# Patient Record
(demographics unavailable — no encounter records)

---

## 2017-06-17 NOTE — ED UPPER EXTREMITY
General


Chief Complaint:  Upper Extremity


Stated Complaint:  RT ARM & HAND PAIN


Nursing Triage Note:  


Brought to ED with family reporting right shouler/arm pain.  Hx of 2 months but 


worsened severely tonight


Nursing Sepsis Screen:  No Definite Risk


Source:  patient


Exam Limitations:  no limitations





History of Present Illness


Time seen by provider:  03:00


Initial Comments


Here with complaint of right arm pain.  She has had pain over the last couple 

of months but it was worse tonight.  She did take an ibuprofen at 10 p.m.  The 

pain then worsened and she came here for further evaluation.  Denies chest pain

, breathing problems, nausea, vomiting or sweating.  Believes that this may 

emanate from the shoulder and radiates down the arm.  She has had shoulder 

problems and had x-ray at her primary care doctor's office that did not show 

any concerning findings per the family.  Denies any recent injury.


Onset:  this evening


Severity:  moderate


Pain/Injury Location:  right shoulder, right arm, right elbow, right forearm


Method of Injury:  unknown


Modifying Factors:  Improves With Immobilization, Worse With Movement





Allergies and Home Medications


Allergies


Coded Allergies:  


     No Known Drug Allergies (Unverified , 9/17/14)





Home Medications


Apixaban 5 Mg Tablet, 5 MG PO BID, #60 Ref 3


   Prescribed by: DANIEL CHOUDHURY on 7/4/15 1030


Diltiazem Hcl 180 Mg Cap, 180 MG PO DAILY, #30 Ref 3


   Prescribed by: DANIEL CHOUDHURY on 7/4/15 1030


Metformin Hcl 1,000 Mg Tablet, 1 EACH PO BID WITH MEALS, (Reported)





Constitutional:  see HPI, No chills, No fever


Respiratory:  no symptoms reported, No cough, No short of breath, No wheezing


Cardiovascular:  No palpitations


Gastrointestinal:  no symptoms reported, No nausea, No vomiting


Musculoskeletal:  see HPI, joint pain, joint swelling, muscle pain, muscle 

stiffness


Skin:  no symptoms reported


Psychiatric/Neurological:  No Symptoms Reported





Past Medical-Social-Family Hx


Patient Social History


Alcohol Use:  Denies Use


Recreational Drug Use:  No


Smoking Status:  Never a Smoker


Recent Foreign Travel:  No


Contact w/Someone Who Travel:  No


Recent Infectious Disease Expo:  No


Recent Hopitalizations:  No





Immunizations Up To Date


Date of Pneumonia Vaccine:  Jul 3, 2013





Seasonal Allergies


Seasonal Allergies:  No





Surgeries


HX Surgeries:  Yes (LYPOMA EXCISED)


Surgeries:  Lumpectomy





Respiratory


Hx Respiratory Disorders:  No





Cardiovascular


Hx Cardiac Disorders:  Yes


Cardiac Disorders:  Hypertension, Irregular Heartbeat





Neurological


Hx Neurological Disorders:  No





Reproductive System


Hx Reproductive Disorders:  No


Sexually Transmitted Disease:  No


HIV/AIDS:  No





Genitourinary


Hx Genitourinary Disorders:  No





Gastrointestinal


Hx Gastrointestinal Disorders:  No





Musculoskeletal


Hx Musculoskeletal Disorders:  No





Endocrine


Hx Endocrine Disorders:  Yes


Endocrine Disorders:  Diabetes, Non-Insulin dep





HEENT


HX ENT Disorders:  No





Cancer


Hx Cancer:  No





Psychosocial


Hx Psychiatric Problems:  No





Integumentary


HX Skin/Integumentary Disorder:  No





Blood Transfusions


Hx Blood Disorders:  No





Reviewed Nursing Assessment


Reviewed/Agree w Nursing PMH:  Yes





Family Medical History


Significant Family History:  No Pertinent Family Hx


Family Medial History:  


Hypertension


  19 FATHER





Physical Exam


Vital Signs





Vital Sign - Last 12Hours








 6/17/17





 02:54


 


Temp 97.6


 


Pulse 69


 


Resp 16


 


B/P (MAP) 179/84


 


Pulse Ox 95


 


O2 Delivery Room Air





Capillary Refill : Less Than 3 Seconds


General Appearance:  WD/WN, mild distress


HEENT:  PERRL/EOMI, pharynx normal


Neck:  full range of motion, supple


Cardiovascular:  regular rate, rhythm, no murmur


Respiratory:  lungs clear, normal breath sounds


Gastrointestinal:  non tender, soft


Back:  normal inspection, no CVA tenderness, no vertebral tenderness


Shoulder:  limited ROM, soft tissue tenderness


Elbow/Forearm:  limited ROM, soft tissue tenderness


Wrist:  Yes pain (home dorsiflexion and palmar flexion)


Hand:  soft tissue tenderness


Neurologic/Psychiatric:  alert, oriented x 3


Skin:  normal color, warm/dry





Progress/Results/Core Measures


Results/Orders


My Orders





Orders - PARRIS MODI MD


Morphine  Injection (Morphine  Injection (6/17/17 03:05)





Vital Signs/I&O





Vital Sign - Last 12Hours








 6/17/17 6/17/17





 02:54 03:12


 


Temp 97.6 97.6


 


Pulse 69 


 


Resp 16 


 


B/P (MAP) 179/84 


 


Pulse Ox 95 


 


O2 Delivery Room Air 














Blood Pressure Mean:  115








Progress Note :  


Progress Note


Seen and evaluated.  Morphine 6 mg IM.  Monitor patient.  0355: Overall 

improved.  Discharged home with return precautions.  Patient verbalize 

understanding instructions and agreement with plan.





Departure


Impression


Impression:  


 Primary Impression:  


 Right arm pain


Disposition:  01 HOME, SELF-CARE


Condition:  Improved





Departure-Patient Inst.


Decision time for Depature:  04:01


Referrals:  


[place]TE [place] DO (PCP/Family)


Primary Care Physician


Patient Instructions:  Shoulder Pain (DC)





Add. Discharge Instructions:  


All discharge instructions reviewed with patient and/or family. Voiced 

understanding.





You may take ibuprofen 800 mg twice daily as needed for pain.  Take other 

medication for severe pain.  Follow-up with your  this week for recheck and 

further evaluation.  Return for worse pain, weakness, breathing problems, chest 

pain, vomiting or other concerns as needed.


Scripts


Hydrocodone/Acetaminophen (Hydrocodon -Acetaminophen 5-325) 1 Each Tablet


1 EACH PO Q6H Y for PAIN-MODERATE TO SEVERE, #12 TAB 0 Refills


   Prov: PARRIS MODI MD         6/17/17











PARRIS MODI MD Jun 17, 2017 03:30

## 2018-01-15 NOTE — STRESS TEST
DATE OF SERVICE:  01/15/2018



EXERCISE STRESS ECHOCARDIOGRAM REPORT



Baseline heart rate is 58, baseline blood pressure 135/71.  Baseline EKG is

sinus rhythm with no ischemic changes.



SUMMARY:

The patient started exercising with a baseline heart rate, blood pressure and

EKG mentioned above.  Occasional PVCs were noted.  She was able to exercise for

a total of 4 minutes 15 seconds on standard Krishna protocol, achieving maximum

heart rate of 130 beats per minute which is 89% of maximum expected heart rate. 

With peak exercise level, EKG was showing minimal nondiagnostic changes.  Blood

pressure was up to 211/111.  During recovery, heart rate and blood pressure

returned to baseline.  EKG returned to baseline.



The resting and stress images were reviewed and compared in the short axis,

horizontal long axis and vertical long axis views.  Review of the images showed

good radiotracer uptake with typical female pattern.  No significant ischemia or

infarction.  SSS is 3, SDS 1, TID value 1.03.  On the gated images, the left

ventricle appeared to be normal size with normal contractility.  Calculated

ejection fraction 76%.



CONCLUSION:

1.  Good exercise tolerance, a total of 4 minutes 15 seconds on standard Krishna

protocol, total of 6 METs achieving 89% of maximum expected heart rate.

2.  Occasional PVCs noted during exercise.

3.  Minimal nondiagnostic EKG changes with exercise returned to baseline during

recovery.

4.  Severe hypertensive response to exercise returned to baseline during

recovery.

5.  Typical female pattern with no significant ischemia or infarction on SPECT

images.

6.  Normal left ventricular size with normal contractility.  Calculated ejection

fraction 76%.





Job ID: [id number]

DocumentID: [id number]

Dictated Date:  01/15/2018 10:27:12

Transcription Date: 01/15/2018 13:02:39

Dictated By: AVE MARROQUIN MD